# Patient Record
Sex: FEMALE | Race: OTHER | Employment: OTHER | URBAN - METROPOLITAN AREA
[De-identification: names, ages, dates, MRNs, and addresses within clinical notes are randomized per-mention and may not be internally consistent; named-entity substitution may affect disease eponyms.]

---

## 2022-03-02 ENCOUNTER — CONSULTATION/EVALUATION (OUTPATIENT)
Dept: URBAN - METROPOLITAN AREA CLINIC 43 | Facility: CLINIC | Age: 62
End: 2022-03-02

## 2022-03-02 DIAGNOSIS — H52.7: ICD-10-CM

## 2022-03-02 PROCEDURE — 99499LK REFRACTIVE CONSULT/LASIK

## 2022-03-02 ASSESSMENT — VISUAL ACUITY
OS_CC: J3
OD_SC: <J12
OS_CC: 20/25-1
OS_BAT: 20/20
OS_SC: <J12
OD_BAT: 20/20
OD_CC: 20/30-2
OD_CC: J1-
OD_SC: 20/60-2
OS_SC: 20/70

## 2022-03-02 ASSESSMENT — TONOMETRY
OS_IOP_MMHG: 11
OD_IOP_MMHG: 10

## 2022-03-02 NOTE — PATIENT DISCUSSION
Patient is interested in being glasses free. Advised that she is not a candidate for cataract surgery at this time. Discussed RLE vs Lasik although lasik may be short lived. Discussed Monovision with lasik as an option, Patient would need to do a CL trial if she decides on this option. Dom eye Nida, -1.50 NonDom. Patient is leaning towards RLE with Advanced lenses vs Monovision RLE. Prices of each given to patient.

## 2022-03-02 NOTE — PATIENT DISCUSSION
Patient understands condition, prognosis and need for follow up care. Reassured patient this is the reason for floaters. Will give it 6 mo to 1 year, if patient is still bothered by Tracy Half will send for retina consult.

## 2022-03-14 ENCOUNTER — CONTACT LENSES/GLASSES VISIT (OUTPATIENT)
Dept: URBAN - METROPOLITAN AREA CLINIC 43 | Facility: CLINIC | Age: 62
End: 2022-03-14

## 2022-03-14 DIAGNOSIS — H52.7: ICD-10-CM

## 2022-03-14 DIAGNOSIS — H43.812: ICD-10-CM

## 2022-03-14 DIAGNOSIS — H25.812: ICD-10-CM

## 2022-03-14 DIAGNOSIS — H43.392: ICD-10-CM

## 2022-03-14 DIAGNOSIS — H25.811: ICD-10-CM

## 2022-03-14 PROCEDURE — 92310L CL MGMNT PRE-LASIK TRIAL

## 2022-03-14 ASSESSMENT — VISUAL ACUITY
OD_CC: 20/30+1
OS_CC: 20/25-2

## 2022-03-14 NOTE — PATIENT DISCUSSION
Patient understands condition, prognosis and need for follow up care. Reassured patient this is the reason for floaters. Will give it 6 mo to 1 year, if patient is still bothered by Sharrell Pain will send for retina consult.

## 2022-03-14 NOTE — PATIENT DISCUSSION
Patient is interested in being glasses free. Advised that she is not a candidate for cataract surgery at this time. Discussed RLE vs Lasik although lasik may be short lived. Discussed Monovision with lasik as an option. Trial today with mono-vision CL and will see in AM for evaluation of tolerance. Patient is leaning towards RLE with Advanced lenses vs Monovision RLE. Prices of each given to patient.

## 2022-03-16 ENCOUNTER — CONTACT LENSES/GLASSES VISIT (OUTPATIENT)
Dept: URBAN - METROPOLITAN AREA CLINIC 43 | Facility: CLINIC | Age: 62
End: 2022-03-16

## 2022-03-16 DIAGNOSIS — H25.811: ICD-10-CM

## 2022-03-16 DIAGNOSIS — H52.7: ICD-10-CM

## 2022-03-16 DIAGNOSIS — H25.812: ICD-10-CM

## 2022-03-16 DIAGNOSIS — H43.392: ICD-10-CM

## 2022-03-16 DIAGNOSIS — H43.812: ICD-10-CM

## 2022-03-16 PROCEDURE — 92310AV CL MGMNT ADVANCED VISION TRIAL ALL INCL

## 2022-03-16 NOTE — PATIENT DISCUSSION
Patient understands condition, prognosis and need for follow up care. Reassured patient this is the reason for floaters. Will give it 6 mo to 1 year, if patient is still bothered by Sierra Zapata will send for retina consult.

## 2022-05-31 ENCOUNTER — ADDENDUM (OUTPATIENT)
Dept: URBAN - METROPOLITAN AREA CLINIC 39 | Facility: CLINIC | Age: 62
End: 2022-05-31

## 2022-05-31 NOTE — PATIENT DISCUSSION
Patient understands condition, prognosis and need for follow up care. Reassured patient this is the reason for floaters. Will give it 6 mo to 1 year, if patient is still bothered by Ciara Greenwood will send for retina consult.

## 2022-06-10 ENCOUNTER — PRE-OP/H&P (OUTPATIENT)
Dept: URBAN - METROPOLITAN AREA SURGERY 14 | Facility: SURGERY | Age: 62
End: 2022-06-10

## 2022-06-10 ENCOUNTER — SURGERY/PROCEDURE (OUTPATIENT)
Dept: URBAN - METROPOLITAN AREA CLINIC 43 | Facility: CLINIC | Age: 62
End: 2022-06-10

## 2022-06-10 ENCOUNTER — POST-OP (OUTPATIENT)
Dept: URBAN - METROPOLITAN AREA SURGERY 14 | Facility: SURGERY | Age: 62
End: 2022-06-10

## 2022-06-10 DIAGNOSIS — H25.813: ICD-10-CM

## 2022-06-10 DIAGNOSIS — H43.812: ICD-10-CM

## 2022-06-10 DIAGNOSIS — Z96.1: ICD-10-CM

## 2022-06-10 DIAGNOSIS — H52.7: ICD-10-CM

## 2022-06-10 DIAGNOSIS — H43.392: ICD-10-CM

## 2022-06-10 PROCEDURE — 66999LNSR LENSAR LASER FOR CAT SX

## 2022-06-10 PROCEDURE — 65772LRI LRI DURING CAT SX

## 2022-06-10 PROCEDURE — 99211T TECH SERVICE

## 2022-06-10 PROCEDURE — 99211HP H&P OFFICE/OUTPATIENT VISIT, EST

## 2022-06-10 PROCEDURE — 66999RAV RLE WITH ADVANCED LENS

## 2022-06-10 ASSESSMENT — TONOMETRY: OD_IOP_MMHG: 20

## 2022-06-10 ASSESSMENT — VISUAL ACUITY: OD_SC: 20/40-2

## 2022-06-10 NOTE — PATIENT DISCUSSION
Patient understands condition, prognosis and need for follow up care. Reassured patient this is the reason for floaters. Will give it 6 mo to 1 year, if patient is still bothered by Malick Lee will send for retina consult.

## 2022-06-10 NOTE — PATIENT DISCUSSION
Patient understands condition, prognosis and need for follow up care. Reassured patient this is the reason for floaters. Will give it 6 mo to 1 year, if patient is still bothered by Justyna Barboza will send for retina consult.

## 2022-06-10 NOTE — PATIENT DISCUSSION
Patient understands condition, prognosis and need for follow up care. Reassured patient this is the reason for floaters. Will give it 6 mo to 1 year, if patient is still bothered by Kavya Maxwell will send for retina consult.

## 2022-06-13 ENCOUNTER — POST OP/EVAL OF SECOND EYE (OUTPATIENT)
Dept: URBAN - METROPOLITAN AREA CLINIC 43 | Facility: CLINIC | Age: 62
End: 2022-06-13

## 2022-06-13 DIAGNOSIS — H25.812: ICD-10-CM

## 2022-06-13 DIAGNOSIS — Z96.1: ICD-10-CM

## 2022-06-13 PROCEDURE — 99024 POSTOP FOLLOW-UP VISIT: CPT

## 2022-06-13 ASSESSMENT — VISUAL ACUITY
OS_BAT: 20/20
OS_SC: 20/100+1
OD_SC: J1
OS_PH: 20/30-1
OS_SC: J14
OD_SC: 20/30+2

## 2022-06-13 ASSESSMENT — TONOMETRY
OS_IOP_MMHG: 11
OD_IOP_MMHG: 15

## 2022-06-13 NOTE — PATIENT DISCUSSION
Cataract surgery has been performed in the first eye and activities of daily living are still impaired. The patient would like to proceed with cataract surgery in the second eye as scheduled. The patient elects synergy IOL OS, goal of emmetropia.

## 2022-06-14 ENCOUNTER — PRE-OP/H&P (OUTPATIENT)
Dept: URBAN - METROPOLITAN AREA SURGERY 14 | Facility: SURGERY | Age: 62
End: 2022-06-14

## 2022-06-14 ENCOUNTER — SURGERY/PROCEDURE (OUTPATIENT)
Dept: URBAN - METROPOLITAN AREA CLINIC 43 | Facility: CLINIC | Age: 62
End: 2022-06-14

## 2022-06-14 DIAGNOSIS — H25.812: ICD-10-CM

## 2022-06-14 DIAGNOSIS — Z96.1: ICD-10-CM

## 2022-06-14 PROCEDURE — 66999RAV RLE WITH ADVANCED LENS

## 2022-06-14 PROCEDURE — 66999LNSR LENSAR LASER FOR CAT SX

## 2022-06-14 PROCEDURE — 99211HP H&P OFFICE/OUTPATIENT VISIT, EST

## 2022-06-14 NOTE — PATIENT DISCUSSION
Patient understands condition, prognosis and need for follow up care. Reassured patient this is the reason for floaters. Will give it 6 mo to 1 year, if patient is still bothered by Brittney Wylie will send for retina consult.

## 2022-06-15 ENCOUNTER — POST-OP (OUTPATIENT)
Dept: URBAN - METROPOLITAN AREA CLINIC 43 | Facility: CLINIC | Age: 62
End: 2022-06-15

## 2022-06-15 DIAGNOSIS — Z96.1: ICD-10-CM

## 2022-06-15 PROCEDURE — 99024 POSTOP FOLLOW-UP VISIT: CPT

## 2022-06-15 PROCEDURE — V2799PMN IMPRIMIS PRED-MOXI-NEPAF 5ML

## 2022-06-15 ASSESSMENT — VISUAL ACUITY
OS_PH: 20/30+1
OS_SC: 20/40-1
OD_SC: 20/25-3
OS_SC: J2-
OD_SC: J1

## 2022-06-15 ASSESSMENT — TONOMETRY
OD_IOP_MMHG: 14
OS_IOP_MMHG: 15

## 2022-07-08 ENCOUNTER — POST-OP (OUTPATIENT)
Dept: URBAN - METROPOLITAN AREA CLINIC 43 | Facility: CLINIC | Age: 62
End: 2022-07-08

## 2022-07-08 DIAGNOSIS — Z96.1: ICD-10-CM

## 2022-07-08 PROCEDURE — 99024 POSTOP FOLLOW-UP VISIT: CPT

## 2022-07-08 ASSESSMENT — VISUAL ACUITY
OS_SC: J1
OD_SC: J1
OD_SC: 20/25-1
OS_SC: 20/20-1

## 2022-07-08 ASSESSMENT — TONOMETRY
OD_IOP_MMHG: 14
OS_IOP_MMHG: 15

## 2022-07-08 NOTE — PATIENT DISCUSSION
Patient understands condition, prognosis and need for follow up care. Reassured patient this is the reason for floaters. Will give it 6 mo to 1 year, if patient is still bothered by Kemi Aguileraws will send for retina consult.

## 2022-09-09 ENCOUNTER — POST-OP (OUTPATIENT)
Dept: URBAN - METROPOLITAN AREA CLINIC 43 | Facility: CLINIC | Age: 62
End: 2022-09-09

## 2022-09-09 DIAGNOSIS — Z96.1: ICD-10-CM

## 2022-09-09 PROCEDURE — 99024 POSTOP FOLLOW-UP VISIT: CPT

## 2022-09-09 ASSESSMENT — TONOMETRY
OS_IOP_MMHG: 12
OD_IOP_MMHG: 12

## 2022-09-09 ASSESSMENT — VISUAL ACUITY
OU: J1 @ 19"
OS_SC: J1 @ 15"
OD_SC: 20/30+2
OU_SC: 20/20-2
OS: J3 @ 20"
OD: J3 @ 20"
OS_SC: 20/25
OD_SC: J1 @ 15"

## 2022-09-09 NOTE — PATIENT DISCUSSION
Patient understands condition, prognosis and need for follow up care. Reassured patient this is the reason for floaters. Will give it 6 mo to 1 year, if patient is still bothered by Benson Weaver will send for retina consult.

## 2022-09-26 ENCOUNTER — SURGERY/PROCEDURE (OUTPATIENT)
Dept: URBAN - METROPOLITAN AREA SURGERY 14 | Facility: SURGERY | Age: 62
End: 2022-09-26

## 2022-09-26 ENCOUNTER — CONSULTATION/EVALUATION (OUTPATIENT)
Dept: URBAN - METROPOLITAN AREA CLINIC 39 | Facility: CLINIC | Age: 62
End: 2022-09-26

## 2022-09-26 DIAGNOSIS — H26.493: ICD-10-CM

## 2022-09-26 PROCEDURE — 92014 COMPRE OPH EXAM EST PT 1/>: CPT

## 2022-09-26 PROCEDURE — 6682150 YAG CAPSULOTOMY

## 2022-09-26 ASSESSMENT — VISUAL ACUITY
OS_SC: 20/20-1
OD_BAT: 20/60
OS_BAT: >20/400
OD_SC: 20/25+2

## 2022-09-26 ASSESSMENT — TONOMETRY
OD_IOP_MMHG: 10
OS_IOP_MMHG: 12

## 2022-09-26 NOTE — PATIENT DISCUSSION
Patient understands condition, prognosis and need for follow up care. Reassured patient this is the reason for floaters. Will give it 6 mo to 1 year, if patient is still bothered by Ingrid Emerson will send for retina consult.

## 2022-10-18 ENCOUNTER — POST-OP (OUTPATIENT)
Dept: URBAN - METROPOLITAN AREA CLINIC 43 | Facility: CLINIC | Age: 62
End: 2022-10-18

## 2022-10-18 DIAGNOSIS — Z98.890: ICD-10-CM

## 2022-10-18 PROCEDURE — 99024 POSTOP FOLLOW-UP VISIT: CPT

## 2022-10-18 ASSESSMENT — TONOMETRY
OS_IOP_MMHG: 14
OD_IOP_MMHG: 14

## 2022-10-18 ASSESSMENT — VISUAL ACUITY
OU_SC: J1
OD_SC: 20/30-2
OS_SC: J1
OU_SC: 20/20-1
OS_SC: 20/25-2
OD_SC: J1

## 2022-10-18 NOTE — PATIENT DISCUSSION
Patient understands condition, prognosis and need for follow up care. Reassured patient this is the reason for floaters. Will give it 6 mo to 1 year, if patient is still bothered by Cody Sutton will send for retina consult.

## 2023-10-20 ENCOUNTER — COMPREHENSIVE EXAM (OUTPATIENT)
Dept: URBAN - METROPOLITAN AREA CLINIC 43 | Facility: CLINIC | Age: 63
End: 2023-10-20

## 2023-10-20 DIAGNOSIS — H02.88A: ICD-10-CM

## 2023-10-20 DIAGNOSIS — H02.88B: ICD-10-CM

## 2023-10-20 PROCEDURE — 99199RSP RESIDENT SUPERVISED BY PROVIDER

## 2023-10-20 PROCEDURE — 92014 COMPRE OPH EXAM EST PT 1/>: CPT

## 2023-10-20 ASSESSMENT — VISUAL ACUITY
OS_SC: J1
OS_SC: 20/20
OD_SC: 20/20
OD_SC: J1

## 2023-10-20 ASSESSMENT — TONOMETRY
OD_IOP_MMHG: 13
OS_IOP_MMHG: 14

## 2024-10-25 ENCOUNTER — COMPREHENSIVE EXAM (OUTPATIENT)
Dept: URBAN - METROPOLITAN AREA CLINIC 43 | Facility: CLINIC | Age: 64
End: 2024-10-25

## 2024-10-25 DIAGNOSIS — H02.88B: ICD-10-CM

## 2024-10-25 DIAGNOSIS — H04.123: ICD-10-CM

## 2024-10-25 DIAGNOSIS — H52.7: ICD-10-CM

## 2024-10-25 DIAGNOSIS — H02.88A: ICD-10-CM

## 2024-10-25 DIAGNOSIS — H43.812: ICD-10-CM

## 2024-10-25 PROCEDURE — 92015 DETERMINE REFRACTIVE STATE: CPT

## 2024-10-25 PROCEDURE — 92014 COMPRE OPH EXAM EST PT 1/>: CPT

## 2024-10-31 NOTE — PATIENT DISCUSSION
Clinical updates sent to patient's facility of residence, [facility].   Patient is interested in being glasses free. Advised that she is not a candidate for cataract surgery at this time. Discussed RLE vs Lasik although lasik may be short lived. Discussed Monovision with lasik as an option. Trial today with mono-vision CL and will see in AM for evaluation of tolerance. Patient is leaning towards RLE with Advanced lenses vs Monovision RLE. Prices of each given to patient.

## 2025-04-10 NOTE — PATIENT DISCUSSION
Discussed in depth possible halos and glare with ADV lenses. pt complaining of feeling warm temp elevated other VS within normal limits